# Patient Record
Sex: FEMALE | Race: OTHER | HISPANIC OR LATINO
[De-identification: names, ages, dates, MRNs, and addresses within clinical notes are randomized per-mention and may not be internally consistent; named-entity substitution may affect disease eponyms.]

---

## 2017-06-19 ENCOUNTER — MEDICATION RENEWAL (OUTPATIENT)
Age: 32
End: 2017-06-19

## 2017-10-09 ENCOUNTER — RX RENEWAL (OUTPATIENT)
Age: 32
End: 2017-10-09

## 2017-12-06 ENCOUNTER — APPOINTMENT (OUTPATIENT)
Dept: ENDOCRINOLOGY | Facility: CLINIC | Age: 32
End: 2017-12-06
Payer: MEDICAID

## 2017-12-06 VITALS
HEART RATE: 71 BPM | SYSTOLIC BLOOD PRESSURE: 111 MMHG | WEIGHT: 143 LBS | BODY MASS INDEX: 25.34 KG/M2 | DIASTOLIC BLOOD PRESSURE: 72 MMHG

## 2017-12-06 PROCEDURE — 99213 OFFICE O/P EST LOW 20 MIN: CPT

## 2018-01-08 ENCOUNTER — TRANSCRIPTION ENCOUNTER (OUTPATIENT)
Age: 33
End: 2018-01-08

## 2018-08-10 ENCOUNTER — MEDICATION RENEWAL (OUTPATIENT)
Age: 33
End: 2018-08-10

## 2018-12-06 ENCOUNTER — APPOINTMENT (OUTPATIENT)
Dept: ENDOCRINOLOGY | Facility: CLINIC | Age: 33
End: 2018-12-06

## 2019-03-20 ENCOUNTER — MEDICATION RENEWAL (OUTPATIENT)
Age: 34
End: 2019-03-20

## 2019-03-20 ENCOUNTER — RX RENEWAL (OUTPATIENT)
Age: 34
End: 2019-03-20

## 2019-06-13 ENCOUNTER — APPOINTMENT (OUTPATIENT)
Dept: ENDOCRINOLOGY | Facility: CLINIC | Age: 34
End: 2019-06-13

## 2019-07-03 ENCOUNTER — RX RENEWAL (OUTPATIENT)
Age: 34
End: 2019-07-03

## 2019-07-08 ENCOUNTER — MEDICATION RENEWAL (OUTPATIENT)
Age: 34
End: 2019-07-08

## 2019-07-30 ENCOUNTER — APPOINTMENT (OUTPATIENT)
Dept: ENDOCRINOLOGY | Facility: CLINIC | Age: 34
End: 2019-07-30
Payer: MEDICAID

## 2019-07-30 VITALS
SYSTOLIC BLOOD PRESSURE: 108 MMHG | WEIGHT: 154 LBS | HEIGHT: 63 IN | BODY MASS INDEX: 27.29 KG/M2 | HEART RATE: 65 BPM | DIASTOLIC BLOOD PRESSURE: 77 MMHG

## 2019-07-30 DIAGNOSIS — Z00.00 ENCOUNTER FOR GENERAL ADULT MEDICAL EXAMINATION W/OUT ABNORMAL FINDINGS: ICD-10-CM

## 2019-07-30 PROCEDURE — 99213 OFFICE O/P EST LOW 20 MIN: CPT

## 2019-07-30 NOTE — HISTORY OF PRESENT ILLNESS
[FreeTextEntry1] : missed last appt because daughter was in CCU for 9 days, diagnosed with Kawasaki disease \par no health issues since last visit\par vitiligo is spreading, now sees dots on her hands and more vitiligo lesions on her arms\par working more, sleeping less. has more stress.\par more anxious\par some palpitations\par weight is up from last year\par periods still regular but now every 31 days, used to be every 28 days\par worried about diabetes because everyone in her family has diabetes \par \par \par PMD: Garry Gallegos\par \par Meds:\par levothyroxine 75mcg/day

## 2019-07-30 NOTE — PHYSICAL EXAM
[Healthy Appearance] : healthy appearance [Alert] : alert [Normal Voice/Communication] : normal voice communication [No Proptosis] : no proptosis [Clear to Auscultation] : lungs were clear to auscultation bilaterally [No Lid Lag] : no lid lag [Normal Hearing] : hearing was normal [Regular Rhythm] : with a regular rhythm [Normal S1, S2] : normal S1 and S2 [No Stigmata of Cushings Syndrome] : no stigmata of cushings syndrome [Normal Affect] : the affect was normal [Normal Insight/Judgement] : insight and judgment were intact [Normal Mood] : the mood was normal [de-identified] : moderate acanthosis nigricans present; patches of vitiligo near both elbows [de-identified] : thyroid palpable, soft

## 2019-07-30 NOTE — ASSESSMENT
[FreeTextEntry1] : Hashimoto's. Goal TSH 0.5-4.0 range. will adjust thyroxine dose, if necessary.\par Suggested stress reducing activities (walking, meditation).  avoid sugary drinks (juice, soda, coffee drinks) to lose weight and increase sleep (goal 7h/night)\par RTO 1 year\par

## 2019-07-31 ENCOUNTER — MEDICATION RENEWAL (OUTPATIENT)
Age: 34
End: 2019-07-31

## 2019-07-31 LAB
ANION GAP SERPL CALC-SCNC: 10 MMOL/L
BUN SERPL-MCNC: 14 MG/DL
CALCIUM SERPL-MCNC: 9.2 MG/DL
CHLORIDE SERPL-SCNC: 102 MMOL/L
CO2 SERPL-SCNC: 27 MMOL/L
CREAT SERPL-MCNC: 0.74 MG/DL
ESTIMATED AVERAGE GLUCOSE: 100 MG/DL
GLUCOSE SERPL-MCNC: 110 MG/DL
HBA1C MFR BLD HPLC: 5.1 %
POTASSIUM SERPL-SCNC: 4.3 MMOL/L
SODIUM SERPL-SCNC: 139 MMOL/L
TSH SERPL-ACNC: 2.44 UIU/ML

## 2020-07-07 ENCOUNTER — APPOINTMENT (OUTPATIENT)
Dept: ENDOCRINOLOGY | Facility: CLINIC | Age: 35
End: 2020-07-07

## 2020-12-15 ENCOUNTER — RX RENEWAL (OUTPATIENT)
Age: 35
End: 2020-12-15

## 2021-03-01 ENCOUNTER — APPOINTMENT (OUTPATIENT)
Dept: ENDOCRINOLOGY | Facility: CLINIC | Age: 36
End: 2021-03-01

## 2022-05-04 ENCOUNTER — APPOINTMENT (OUTPATIENT)
Dept: ENDOCRINOLOGY | Facility: CLINIC | Age: 37
End: 2022-05-04